# Patient Record
Sex: FEMALE | Race: WHITE | Employment: FULL TIME | URBAN - METROPOLITAN AREA
[De-identification: names, ages, dates, MRNs, and addresses within clinical notes are randomized per-mention and may not be internally consistent; named-entity substitution may affect disease eponyms.]

---

## 2023-03-02 ENCOUNTER — HOSPITAL ENCOUNTER (OUTPATIENT)
Age: 28
Discharge: HOME | End: 2023-03-02
Payer: COMMERCIAL

## 2023-03-02 VITALS
TEMPERATURE: 98 F | HEART RATE: 96 BPM | WEIGHT: 163 LBS | RESPIRATION RATE: 18 BRPM | OXYGEN SATURATION: 96 % | BODY MASS INDEX: 23.34 KG/M2 | SYSTOLIC BLOOD PRESSURE: 129 MMHG | DIASTOLIC BLOOD PRESSURE: 74 MMHG | HEIGHT: 70 IN

## 2023-03-02 DIAGNOSIS — J02.9 ACUTE VIRAL PHARYNGITIS: Primary | ICD-10-CM

## 2023-03-02 DIAGNOSIS — Z76.89 ENCOUNTER FOR ASSESSMENT OF STD EXPOSURE: ICD-10-CM

## 2023-03-02 LAB
B-HCG UR QL: NEGATIVE
POCT MONO: NEGATIVE
S PYO AG THROAT QL: NEGATIVE

## 2023-03-02 PROCEDURE — 99204 OFFICE O/P NEW MOD 45 MIN: CPT

## 2023-03-02 PROCEDURE — 87491 CHLMYD TRACH DNA AMP PROBE: CPT

## 2023-03-02 PROCEDURE — 87591 N.GONORRHOEAE DNA AMP PROB: CPT

## 2023-03-02 PROCEDURE — 86308 HETEROPHILE ANTIBODY SCREEN: CPT

## 2023-03-02 PROCEDURE — 81025 URINE PREGNANCY TEST: CPT

## 2023-03-02 PROCEDURE — 87880 STREP A ASSAY W/OPTIC: CPT

## 2023-03-02 NOTE — DISCHARGE INSTRUCTIONS
Strep test is negative. Mono test is negative. We sent a swab for pharyngeal gonorrhea and Chlamydia testing. I also sent a urine test for gonorrhea and chlamydia. We will call you in a few days if you need further treatment if these come back positive. There are no signs of infection on physical exam.  This is likely a viral illness. Please be sure to drink plenty of fluids, use Tylenol and Motrin for pain or fever. If you develop any respiratory complaints, fever that does not improve with medications or any other concerning complaints you should go to the emergency department. Otherwise follow up with your primary care provider.

## 2023-03-02 NOTE — ED INITIAL ASSESSMENT (HPI)
Pt in [address] [place] [address] for c/o swollen tonsils, lymph nodes with white exudate and fever of 102 since yesterday. States concerned for strep and mono.

## 2023-03-03 LAB
C TRACH DNA SPEC QL NAA+PROBE: NEGATIVE
N GONORRHOEA DNA SPEC QL NAA+PROBE: NEGATIVE

## 2023-03-04 LAB
C. TRACHOMATIS AMPLIFIED: NEGATIVE
N. GONORRHOEAE AMPLIFIED: NEGATIVE

## 2023-04-26 ENCOUNTER — HOSPITAL ENCOUNTER (OUTPATIENT)
Age: 28
Discharge: HOME | End: 2023-04-26
Payer: COMMERCIAL

## 2023-04-26 VITALS
OXYGEN SATURATION: 100 % | RESPIRATION RATE: 17 BRPM | SYSTOLIC BLOOD PRESSURE: 120 MMHG | HEART RATE: 75 BPM | TEMPERATURE: 98 F | DIASTOLIC BLOOD PRESSURE: 75 MMHG

## 2023-04-26 DIAGNOSIS — S01.81XA CHIN LACERATION, INITIAL ENCOUNTER: Primary | ICD-10-CM

## 2023-04-26 PROCEDURE — 90471 IMMUNIZATION ADMIN: CPT | Performed by: NURSE PRACTITIONER

## 2023-04-26 PROCEDURE — 99213 OFFICE O/P EST LOW 20 MIN: CPT | Performed by: NURSE PRACTITIONER

## 2023-04-26 PROCEDURE — 90715 TDAP VACCINE 7 YRS/> IM: CPT | Performed by: NURSE PRACTITIONER

## 2023-04-26 PROCEDURE — 12011 RPR F/E/E/N/L/M 2.5 CM/<: CPT | Performed by: NURSE PRACTITIONER

## 2023-04-27 NOTE — DISCHARGE INSTRUCTIONS
Keep the area dry for the next few hours. After that you may wash with soap and water but avoid emollients and ointments. Tylenol or Motrin as needed for pain. Cool compresses for swelling. Glue and Steri-Strips will fall off in about a week. Avoid picking at it.   Follow-up with your doctor as needed

## 2023-08-30 ENCOUNTER — OFFICE VISIT (OUTPATIENT)
Dept: OCCUPATIONAL MEDICINE | Age: 28
End: 2023-08-30
Attending: NURSE PRACTITIONER

## 2023-08-30 ENCOUNTER — HOSPITAL ENCOUNTER (OUTPATIENT)
Dept: GENERAL RADIOLOGY | Age: 28
Discharge: HOME | End: 2023-08-30
Attending: NURSE PRACTITIONER

## 2023-08-30 DIAGNOSIS — M25.512 LEFT ANTERIOR SHOULDER PAIN: ICD-10-CM

## 2023-08-30 DIAGNOSIS — M25.512 LEFT ANTERIOR SHOULDER PAIN: Primary | ICD-10-CM

## 2023-08-30 PROCEDURE — 73030 X-RAY EXAM OF SHOULDER: CPT | Performed by: NURSE PRACTITIONER

## 2023-08-30 RX ORDER — IBUPROFEN 600 MG/1
600 TABLET ORAL EVERY 6 HOURS
Qty: 28 TABLET | Refills: 0 | Status: SHIPPED | OUTPATIENT
Start: 2023-08-30 | End: 2023-09-06

## 2023-09-06 ENCOUNTER — APPOINTMENT (OUTPATIENT)
Dept: OCCUPATIONAL MEDICINE | Age: 28
End: 2023-09-06
Attending: NURSE PRACTITIONER

## 2023-10-12 ENCOUNTER — HOSPITAL ENCOUNTER (OUTPATIENT)
Dept: MRI IMAGING | Facility: HOSPITAL | Age: 28
Discharge: HOME | End: 2023-10-12
Attending: ORTHOPAEDIC SURGERY

## 2023-10-12 ENCOUNTER — HOSPITAL ENCOUNTER (OUTPATIENT)
Dept: GENERAL RADIOLOGY | Facility: HOSPITAL | Age: 28
Discharge: HOME | End: 2023-10-12
Attending: ORTHOPAEDIC SURGERY
Payer: OTHER MISCELLANEOUS

## 2023-10-12 DIAGNOSIS — M25.512 SHOULDER PAIN, LEFT: ICD-10-CM

## 2023-10-12 PROCEDURE — 23350 INJECTION FOR SHOULDER X-RAY: CPT | Performed by: ORTHOPAEDIC SURGERY

## 2023-10-12 PROCEDURE — 73222 MRI JOINT UPR EXTREM W/DYE: CPT | Performed by: ORTHOPAEDIC SURGERY

## 2023-10-12 PROCEDURE — 77002 NEEDLE LOCALIZATION BY XRAY: CPT | Performed by: ORTHOPAEDIC SURGERY

## 2023-10-12 PROCEDURE — A9575 INJ GADOTERATE MEGLUMI 0.1ML: HCPCS | Performed by: ORTHOPAEDIC SURGERY

## 2023-10-12 RX ORDER — DIPHENHYDRAMINE HYDROCHLORIDE 50 MG/ML
10 INJECTION, SOLUTION INTRAMUSCULAR; INTRAVENOUS
Status: COMPLETED | OUTPATIENT
Start: 2023-10-12 | End: 2023-10-12

## 2023-10-12 RX ADMIN — DIPHENHYDRAMINE HYDROCHLORIDE 0.2 ML: 50 INJECTION, SOLUTION INTRAMUSCULAR; INTRAVENOUS at 10:38:00

## 2023-10-18 ENCOUNTER — HOSPITAL ENCOUNTER (OUTPATIENT)
Dept: GENERAL RADIOLOGY | Facility: HOSPITAL | Age: 28
Discharge: HOME | End: 2023-10-18
Attending: ORTHOPAEDIC SURGERY
Payer: OTHER MISCELLANEOUS

## 2023-10-18 ENCOUNTER — HOSPITAL ENCOUNTER (OUTPATIENT)
Dept: MRI IMAGING | Facility: HOSPITAL | Age: 28
Discharge: HOME | End: 2023-10-18
Attending: ORTHOPAEDIC SURGERY
Payer: OTHER MISCELLANEOUS

## 2023-10-18 DIAGNOSIS — M25.512 SHOULDER PAIN, LEFT: ICD-10-CM

## 2023-10-18 PROCEDURE — 23350 INJECTION FOR SHOULDER X-RAY: CPT | Performed by: ORTHOPAEDIC SURGERY

## 2023-10-18 PROCEDURE — 77002 NEEDLE LOCALIZATION BY XRAY: CPT | Performed by: ORTHOPAEDIC SURGERY

## 2023-10-18 PROCEDURE — A9575 INJ GADOTERATE MEGLUMI 0.1ML: HCPCS | Performed by: ORTHOPAEDIC SURGERY

## 2023-10-18 PROCEDURE — 73222 MRI JOINT UPR EXTREM W/DYE: CPT | Performed by: ORTHOPAEDIC SURGERY

## 2023-10-18 RX ORDER — LIDOCAINE HYDROCHLORIDE 10 MG/ML
INJECTION, SOLUTION EPIDURAL; INFILTRATION; INTRACAUDAL; PERINEURAL
Status: COMPLETED
Start: 2023-10-18 | End: 2023-10-18

## 2023-10-18 RX ORDER — DIPHENHYDRAMINE HYDROCHLORIDE 50 MG/ML
10 INJECTION, SOLUTION INTRAMUSCULAR; INTRAVENOUS
Status: COMPLETED | OUTPATIENT
Start: 2023-10-18 | End: 2023-10-18

## 2023-10-18 RX ADMIN — DIPHENHYDRAMINE HYDROCHLORIDE 0.1 ML: 50 INJECTION, SOLUTION INTRAMUSCULAR; INTRAVENOUS at 11:18:00

## 2023-10-18 RX ADMIN — LIDOCAINE HYDROCHLORIDE 5 ML: 10 INJECTION, SOLUTION EPIDURAL; INFILTRATION; INTRACAUDAL; PERINEURAL at 11:30:00

## 2024-01-30 ENCOUNTER — HOSPITAL ENCOUNTER (OUTPATIENT)
Dept: GENERAL RADIOLOGY | Facility: HOSPITAL | Age: 29
Discharge: HOME | End: 2024-01-30
Attending: ORTHOPAEDIC SURGERY
Payer: OTHER MISCELLANEOUS

## 2024-01-30 ENCOUNTER — LAB ENCOUNTER (OUTPATIENT)
Dept: LAB | Facility: HOSPITAL | Age: 29
End: 2024-01-30
Attending: ORTHOPAEDIC SURGERY
Payer: OTHER MISCELLANEOUS

## 2024-01-30 DIAGNOSIS — Z01.818 PRE-OP EXAM: Primary | ICD-10-CM

## 2024-01-30 DIAGNOSIS — Z01.818 PRE-OP TESTING: ICD-10-CM

## 2024-01-30 LAB
ANION GAP SERPL CALC-SCNC: 5 MMOL/L (ref 0–18)
APTT PPP: 26.3 SECONDS (ref 23.3–35.6)
ATRIAL RATE: 94 BPM
BASOPHILS # BLD AUTO: 0.03 X10(3) UL (ref 0–0.2)
BASOPHILS NFR BLD AUTO: 0.5 %
BUN BLD-MCNC: 16 MG/DL (ref 9–23)
BUN/CREAT SERPL: 21.3 (ref 10–20)
CALCIUM BLD-MCNC: 9.5 MG/DL (ref 8.7–10.4)
CHLORIDE SERPL-SCNC: 109 MMOL/L (ref 98–112)
CO2 SERPL-SCNC: 27 MMOL/L (ref 21–32)
CREAT BLD-MCNC: 0.75 MG/DL
DEPRECATED RDW RBC AUTO: 35.5 FL (ref 35.1–46.3)
EGFRCR SERPLBLD CKD-EPI 2021: 110 ML/MIN/1.73M2 (ref 60–?)
EOSINOPHIL # BLD AUTO: 0.09 X10(3) UL (ref 0–0.7)
EOSINOPHIL NFR BLD AUTO: 1.5 %
ERYTHROCYTE [DISTWIDTH] IN BLOOD BY AUTOMATED COUNT: 11.6 % (ref 11–15)
FASTING STATUS PATIENT QL REPORTED: YES
GLUCOSE BLD-MCNC: 86 MG/DL (ref 70–99)
HCT VFR BLD AUTO: 41.5 %
HGB BLD-MCNC: 14 G/DL
IMM GRANULOCYTES # BLD AUTO: 0.02 X10(3) UL (ref 0–1)
IMM GRANULOCYTES NFR BLD: 0.3 %
INR BLD: 0.93 (ref 0.8–1.2)
LYMPHOCYTES # BLD AUTO: 1.54 X10(3) UL (ref 1–4)
LYMPHOCYTES NFR BLD AUTO: 25 %
MCH RBC QN AUTO: 28.6 PG (ref 26–34)
MCHC RBC AUTO-ENTMCNC: 33.7 G/DL (ref 31–37)
MCV RBC AUTO: 84.7 FL
MONOCYTES # BLD AUTO: 0.43 X10(3) UL (ref 0.1–1)
MONOCYTES NFR BLD AUTO: 7 %
NEUTROPHILS # BLD AUTO: 4.05 X10 (3) UL (ref 1.5–7.7)
NEUTROPHILS # BLD AUTO: 4.05 X10(3) UL (ref 1.5–7.7)
NEUTROPHILS NFR BLD AUTO: 65.7 %
OSMOLALITY SERPL CALC.SUM OF ELEC: 292 MOSM/KG (ref 275–295)
P AXIS: 32 DEGREES
P-R INTERVAL: 136 MS
PLATELET # BLD AUTO: 282 10(3)UL (ref 150–450)
POTASSIUM SERPL-SCNC: 4.2 MMOL/L (ref 3.5–5.1)
PROTHROMBIN TIME: 13 SECONDS (ref 11.6–14.8)
Q-T INTERVAL: 356 MS
QRS DURATION: 68 MS
QTC CALCULATION (BEZET): 445 MS
R AXIS: 41 DEGREES
RBC # BLD AUTO: 4.9 X10(6)UL
SODIUM SERPL-SCNC: 141 MMOL/L (ref 136–145)
T AXIS: 33 DEGREES
VENTRICULAR RATE: 94 BPM
WBC # BLD AUTO: 6.2 X10(3) UL (ref 4–11)

## 2024-01-30 PROCEDURE — 85025 COMPLETE CBC W/AUTO DIFF WBC: CPT

## 2024-01-30 PROCEDURE — 93005 ELECTROCARDIOGRAM TRACING: CPT

## 2024-01-30 PROCEDURE — 36415 COLL VENOUS BLD VENIPUNCTURE: CPT

## 2024-01-30 PROCEDURE — 85730 THROMBOPLASTIN TIME PARTIAL: CPT

## 2024-01-30 PROCEDURE — 93010 ELECTROCARDIOGRAM REPORT: CPT | Performed by: STUDENT IN AN ORGANIZED HEALTH CARE EDUCATION/TRAINING PROGRAM

## 2024-01-30 PROCEDURE — 85610 PROTHROMBIN TIME: CPT

## 2024-01-30 PROCEDURE — 80048 BASIC METABOLIC PNL TOTAL CA: CPT

## 2024-01-30 PROCEDURE — 71046 X-RAY EXAM CHEST 2 VIEWS: CPT | Performed by: ORTHOPAEDIC SURGERY

## 2024-02-08 RX ORDER — IBUPROFEN 400 MG/1
400 TABLET ORAL EVERY 6 HOURS PRN
COMMUNITY

## 2024-02-08 RX ORDER — GARLIC EXTRACT 500 MG
1 CAPSULE ORAL DAILY
COMMUNITY

## 2024-02-12 NOTE — DISCHARGE INSTRUCTIONS
HOME INSTRUCTIONS  [facility]    Post-op Shoulder Arthroscopy   Subacromial Decompression  Rotator Cuff Repair  SLAP Repair Instructions          Diet:  Begin with clear liquids, then resume regular diet as you can tolerate.      2.     Wound Care:  Please wash your hands before and after dressing changes.  Remove the dressing in two days and apply band-aids to the wounds.  Shower in 24 hours but you must cover the wound with plastic to keep it dry for 48 hours.  Do not bathe or swim until sutures are removed.  Sutures will be removed at the first office visit    3.   Icing:  Apply ice packs to shoulder 3 to 4 times per day for 20 minutes at a time for swelling and pain    4. Sling:  For rotator cuff repair or Labral (cartilage) repair:  A special brace may be applied to stabilize your shoulder.  You may remove it for showering and range of motion exercises for elbow and wrist but then reapply and wear it at ALL times until seen for your follow up visit.     5. Physical Therapy:  See PT sheet.    8. Pain Control:  You may have pain in your shoulder the night of surgery after the effects of the local anesthetic wear off.  The pain will respond best to rest, ice, elevation and the pain medicine you were given.  Pain medication may make you feel drowsy.  Use the medication as prescribed and DO NOT DRIVE, DRINK ALCOHOL, OR PERFORM DUTIES THAT REQUIRE CONCENTRATION OR MANUAL LABOR while on the medication.  Take the medication with food or milk.  After the first 48 hours after surgery it may be helpful to take an over-the-counter anti-inflammatory such as ibuprofen for pain control if you do not have any difficulties with these medicines (i.e. stomach ulcers, kidney problems, allergy to aspirin).  Do not take both anti-inflammatory medications and aspirin at the same time.      7. Driving/Work/School:  At the first office visit after surgery your doctor will discuss when you can drive and  return to work or school.  If you need this information sooner please call your doctor's office.     8. Sports:  Do not resume sports until you have discussed your activity with your Doctor at the first post-op visit.       9. Follow-up Appointment:  You should have a follow-up appointment scheduled 10-14 days after your surgery.  Please call the Doctors office to schedule this appointment.   [place]  [phone]   [place]  [phone]   [place] [phone]    14.   Additional Instructions:  SIGNS AND SYMPTOMS to report to your Doctor:  Persistent fever greater than 100 degrees; wound redness or drainage; numbness and /or tingling in the affected extremity or any severe calf pain.  Please feel free to call the Doctors office if there are any problems.         [facility] HOME CARE INSTRUCTIONS: POST-OP ANESTHESIA  The medication that you received for sedation or general anesthesia can last up to 24 hours. Your judgment and reflexes may be altered, even if you feel like your normal self.      We Recommend:   Do not drive any motor vehicle or bicycle   Avoid mowing the lawn, playing sports, or working with power tools/applicances (power saws, electric knives or mixers)   That you have someone stay with you on your first night home   Do not drink alcohol or take sleeping pills or tranquilizers   Do not sign legal documents within 24 hours of your procedure   If you had a nerve block for your surgery, take extra care not to put any pressure on your arm or hand for 24 hours    It is normal:  For you to have a sore throat if you had a breathing tube during surgery (while you were asleep!). The sore throat should get better within 48 hours. You can gargle with warm salt water (1/2 tsp in 4 oz warm water) or use a throat lozenge for comfort  To feel muscle aches or soreness especially in the abdomen, chest or neck. The achy feeling should go away in the next 24 hours  To feel weak, sleepy or \"wiped out\". Your  should start feeling better in the next 24 hours.   To experience mild discomforts such as sore lip or tongue, headache, cramps, gas pains or a bloated feeling in your abdomen.   To experience mild back pain or soreness for a day or two if you had spinal or epidural anesthesia.   If you had laparoscopic surgery, to feel shoulder pain or discomfort on the day of surgery.   For some patients to have nausea after surgery/anesthesia    If you feel nausea or experience vomiting:   Try to move around less.   Eat less than usual or drink only liquids until the next morning   Nausea should resolve in about 24 hours    If you have a problem when you are at home:    Call your surgeons office   Discharge Instructions: After Your Surgery  You’ve just had surgery. During surgery, you were given medicine called anesthesia to keep you relaxed and free of pain. After surgery, you may have some pain or nausea. This is common. Here are some tips for feeling better and getting well after surgery.   Going home  Your healthcare provider will show you how to take care of yourself when you go home. They'll also answer your questions. Have an adult family member or friend drive you home. For the first 24 hours after your surgery:   Don't drive or use heavy equipment.  Don't make important decisions or sign legal papers.  Take medicines as directed.  Don't drink alcohol.  Have someone stay with you, if needed. They can watch for problems and help keep you safe.  Be sure to go to all follow-up visits with your healthcare provider. And rest after your surgery for as long as your provider tells you to.   Coping with pain  If you have pain after surgery, pain medicine will help you feel better. Take it as directed, before pain becomes severe. Also, ask your healthcare provider or pharmacist about other ways to control pain. This might be with heat, ice, or relaxation. And follow any other instructions your surgeon or nurse gives you.      Stay  on schedule with your medicine.     Tips for taking pain medicine  To get the best relief possible, remember these points:   Pain medicines can upset your stomach. Taking them with a little food may help.  Most pain relievers taken by mouth need at least 20 to 30 minutes to start to work.  Don't wait till your pain becomes severe before you take your medicine. Try to time your medicine so that you can take it before starting an activity. This might be before you get dressed, go for a walk, or sit down for dinner.  Constipation is a common side effect of some pain medicines. Call your healthcare provider before taking any medicines such as laxatives or stool softeners to help ease constipation. Also ask if you should skip any foods. Drinking lots of fluids and eating foods such as fruits and vegetables that are high in fiber can also help. Remember, don't take laxatives unless your surgeon has prescribed them.  Drinking alcohol and taking pain medicine can cause dizziness and slow your breathing. It can even be deadly. Don't drink alcohol while taking pain medicine.  Pain medicine can make you react more slowly to things. Don't drive or run machinery while taking pain medicine.  Your healthcare provider may tell you to take acetaminophen to help ease your pain. Ask them how much you're supposed to take each day. Acetaminophen or other pain relievers may interact with your prescription medicines or other over-the-counter (OTC) medicines. Some prescription medicines have acetaminophen and other ingredients in them. Using both prescription and OTC acetaminophen for pain can cause you to accidentally overdose. Read the labels on your OTC medicines with care. This will help you to clearly know the list of ingredients, how much to take, and any warnings. It may also help you not take too much acetaminophen. If you have questions or don't understand the information, ask your pharmacist or healthcare provider to explain it  to you before you take the OTC medicine.   Managing nausea  Some people have an upset stomach (nausea) after surgery. This is often because of anesthesia, pain, or pain medicine, less movement of food in the stomach, or the stress of surgery. These tips will help you handle nausea and eat healthy foods as you get better. If you were on a special food plan before surgery, ask your healthcare provider if you should follow it while you get better. Check with your provider on how your eating should progress. It may depend on the surgery you had. These general tips may help:   Don't push yourself to eat. Your body will tell you when to eat and how much.  Start off with clear liquids and soup. They're easier to digest.  Next try semi-solid foods as you feel ready. These include mashed potatoes, applesauce, and gelatin.  Slowly move to solid foods. Don’t eat fatty, rich, or spicy foods at first.  Don't force yourself to have 3 large meals a day. Instead eat smaller amounts more often.  Take pain medicines with a small amount of solid food, such as crackers or toast. This helps prevent nausea.  When to call your healthcare provider  Call your healthcare provider right away if you have any of these:   You still have too much pain, or the pain gets worse, after taking the medicine. The medicine may not be strong enough. Or there may be a complication from the surgery.  You feel too sleepy, dizzy, or groggy. The medicine may be too strong.  Side effects such as nausea or vomiting. Your healthcare provider may advise taking other medicines to .  Skin changes such as rash, itching, or hives. This may mean you have an allergic reaction. Your provider may advise taking other medicines.  The incision looks different (for instance, part of it opens up).  Bleeding or fluid leaking from the incision site, and weren't told to expect that.  Fever of 100.4°F (38°C) or higher, or as directed by your provider.  Call 911  Call 911 right  away if you have:   Trouble breathing  Facial swelling    If you have obstructive sleep apnea   You were given anesthesia medicine during surgery to keep you comfortable and free of pain. After surgery, you may have more apnea spells because of this medicine and other medicines you were given. The spells may last longer than normal.    At home:  Keep using the continuous positive airway pressure (CPAP) device when you sleep. Unless your healthcare provider tells you not to, use it when you sleep, day or night. CPAP is a common device used to treat obstructive sleep apnea.  Talk with your provider before taking any pain medicine, muscle relaxants, or sedatives. Your provider will tell you about the possible dangers of taking these medicines.  Contact your provider if your sleeping changes a lot even when taking medicines as directed.  Berenice last reviewed this educational content on 10/1/2021  © 4034-6992 [employer]. All rights reserved. This information is not intended as a substitute for professional medical care. Always follow your healthcare professional's instructions.

## 2024-02-13 ENCOUNTER — HOSPITAL ENCOUNTER (OUTPATIENT)
Facility: HOSPITAL | Age: 29
Setting detail: HOSPITAL OUTPATIENT SURGERY
Discharge: HOME | End: 2024-02-13
Attending: ORTHOPAEDIC SURGERY | Admitting: ORTHOPAEDIC SURGERY
Payer: OTHER MISCELLANEOUS

## 2024-02-13 ENCOUNTER — ANESTHESIA (OUTPATIENT)
Dept: SURGERY | Facility: HOSPITAL | Age: 29
End: 2024-02-13
Payer: OTHER MISCELLANEOUS

## 2024-02-13 ENCOUNTER — ANESTHESIA EVENT (OUTPATIENT)
Dept: SURGERY | Facility: HOSPITAL | Age: 29
End: 2024-02-13
Payer: OTHER MISCELLANEOUS

## 2024-02-13 VITALS
HEART RATE: 69 BPM | HEIGHT: 70 IN | RESPIRATION RATE: 16 BRPM | OXYGEN SATURATION: 100 % | BODY MASS INDEX: 27.63 KG/M2 | WEIGHT: 193 LBS | SYSTOLIC BLOOD PRESSURE: 127 MMHG | DIASTOLIC BLOOD PRESSURE: 70 MMHG | TEMPERATURE: 98 F

## 2024-02-13 PROBLEM — M25.512 LEFT SHOULDER PAIN: Status: ACTIVE | Noted: 2024-02-13

## 2024-02-13 LAB — B-HCG UR QL: NEGATIVE

## 2024-02-13 PROCEDURE — 0RNK4ZZ RELEASE LEFT SHOULDER JOINT, PERCUTANEOUS ENDOSCOPIC APPROACH: ICD-10-PCS | Performed by: ORTHOPAEDIC SURGERY

## 2024-02-13 PROCEDURE — 81025 URINE PREGNANCY TEST: CPT

## 2024-02-13 PROCEDURE — 0RQK4ZZ REPAIR LEFT SHOULDER JOINT, PERCUTANEOUS ENDOSCOPIC APPROACH: ICD-10-PCS | Performed by: ORTHOPAEDIC SURGERY

## 2024-02-13 PROCEDURE — 64415 NJX AA&/STRD BRCH PLXS IMG: CPT | Performed by: ORTHOPAEDIC SURGERY

## 2024-02-13 DEVICE — SUTURE ANCHOR, BIO- COMPOSITE SUTURETAK
Type: IMPLANTABLE DEVICE | Site: SHOULDER | Status: FUNCTIONAL
Brand: ARTHREX®

## 2024-02-13 RX ORDER — HYDROMORPHONE HYDROCHLORIDE 1 MG/ML
0.2 INJECTION, SOLUTION INTRAMUSCULAR; INTRAVENOUS; SUBCUTANEOUS EVERY 5 MIN PRN
Status: DISCONTINUED | OUTPATIENT
Start: 2024-02-13 | End: 2024-02-13

## 2024-02-13 RX ORDER — ROCURONIUM BROMIDE 10 MG/ML
INJECTION, SOLUTION INTRAVENOUS AS NEEDED
Status: DISCONTINUED | OUTPATIENT
Start: 2024-02-13 | End: 2024-02-13 | Stop reason: SURG

## 2024-02-13 RX ORDER — ONDANSETRON 2 MG/ML
4 INJECTION INTRAMUSCULAR; INTRAVENOUS EVERY 6 HOURS PRN
Status: DISCONTINUED | OUTPATIENT
Start: 2024-02-13 | End: 2024-02-13

## 2024-02-13 RX ORDER — CEFAZOLIN SODIUM/WATER 2 G/20 ML
SYRINGE (ML) INTRAVENOUS AS NEEDED
Status: DISCONTINUED | OUTPATIENT
Start: 2024-02-13 | End: 2024-02-13 | Stop reason: SURG

## 2024-02-13 RX ORDER — CEFAZOLIN SODIUM/WATER 2 G/20 ML
2 SYRINGE (ML) INTRAVENOUS ONCE
Status: DISCONTINUED | OUTPATIENT
Start: 2024-02-13 | End: 2024-02-13 | Stop reason: HOSPADM

## 2024-02-13 RX ORDER — SODIUM CHLORIDE, SODIUM LACTATE, POTASSIUM CHLORIDE, CALCIUM CHLORIDE 600; 310; 30; 20 MG/100ML; MG/100ML; MG/100ML; MG/100ML
INJECTION, SOLUTION INTRAVENOUS CONTINUOUS
Status: DISCONTINUED | OUTPATIENT
Start: 2024-02-13 | End: 2024-02-13

## 2024-02-13 RX ORDER — MORPHINE SULFATE 10 MG/ML
6 INJECTION, SOLUTION INTRAMUSCULAR; INTRAVENOUS EVERY 10 MIN PRN
Status: DISCONTINUED | OUTPATIENT
Start: 2024-02-13 | End: 2024-02-13

## 2024-02-13 RX ORDER — ROPIVACAINE HYDROCHLORIDE 5 MG/ML
INJECTION, SOLUTION EPIDURAL; INFILTRATION; PERINEURAL
Status: COMPLETED | OUTPATIENT
Start: 2024-02-13 | End: 2024-02-13

## 2024-02-13 RX ORDER — GLYCOPYRROLATE 0.2 MG/ML
INJECTION, SOLUTION INTRAMUSCULAR; INTRAVENOUS AS NEEDED
Status: DISCONTINUED | OUTPATIENT
Start: 2024-02-13 | End: 2024-02-13 | Stop reason: SURG

## 2024-02-13 RX ORDER — MIDAZOLAM HYDROCHLORIDE 1 MG/ML
INJECTION INTRAMUSCULAR; INTRAVENOUS
Status: COMPLETED | OUTPATIENT
Start: 2024-02-13 | End: 2024-02-13

## 2024-02-13 RX ORDER — PROCHLORPERAZINE EDISYLATE 5 MG/ML
5 INJECTION INTRAMUSCULAR; INTRAVENOUS EVERY 8 HOURS PRN
Status: DISCONTINUED | OUTPATIENT
Start: 2024-02-13 | End: 2024-02-13

## 2024-02-13 RX ORDER — NEOSTIGMINE METHYLSULFATE 1 MG/ML
INJECTION, SOLUTION INTRAVENOUS AS NEEDED
Status: DISCONTINUED | OUTPATIENT
Start: 2024-02-13 | End: 2024-02-13 | Stop reason: SURG

## 2024-02-13 RX ORDER — HYDROMORPHONE HYDROCHLORIDE 1 MG/ML
0.4 INJECTION, SOLUTION INTRAMUSCULAR; INTRAVENOUS; SUBCUTANEOUS EVERY 5 MIN PRN
Status: DISCONTINUED | OUTPATIENT
Start: 2024-02-13 | End: 2024-02-13

## 2024-02-13 RX ORDER — MORPHINE SULFATE 4 MG/ML
4 INJECTION, SOLUTION INTRAMUSCULAR; INTRAVENOUS EVERY 10 MIN PRN
Status: DISCONTINUED | OUTPATIENT
Start: 2024-02-13 | End: 2024-02-13

## 2024-02-13 RX ORDER — HYDROMORPHONE HYDROCHLORIDE 1 MG/ML
0.6 INJECTION, SOLUTION INTRAMUSCULAR; INTRAVENOUS; SUBCUTANEOUS EVERY 5 MIN PRN
Status: DISCONTINUED | OUTPATIENT
Start: 2024-02-13 | End: 2024-02-13

## 2024-02-13 RX ORDER — DEXAMETHASONE SODIUM PHOSPHATE 4 MG/ML
VIAL (ML) INJECTION AS NEEDED
Status: DISCONTINUED | OUTPATIENT
Start: 2024-02-13 | End: 2024-02-13 | Stop reason: SURG

## 2024-02-13 RX ORDER — MORPHINE SULFATE 2 MG/ML
2 INJECTION, SOLUTION INTRAMUSCULAR; INTRAVENOUS EVERY 10 MIN PRN
Status: DISCONTINUED | OUTPATIENT
Start: 2024-02-13 | End: 2024-02-13

## 2024-02-13 RX ORDER — NALOXONE HYDROCHLORIDE 0.4 MG/ML
80 INJECTION, SOLUTION INTRAMUSCULAR; INTRAVENOUS; SUBCUTANEOUS AS NEEDED
Status: DISCONTINUED | OUTPATIENT
Start: 2024-02-13 | End: 2024-02-13

## 2024-02-13 RX ORDER — DEXAMETHASONE SODIUM PHOSPHATE 10 MG/ML
INJECTION, SOLUTION INTRAMUSCULAR; INTRAVENOUS
Status: COMPLETED | OUTPATIENT
Start: 2024-02-13 | End: 2024-02-13

## 2024-02-13 RX ORDER — LIDOCAINE HYDROCHLORIDE 10 MG/ML
INJECTION, SOLUTION EPIDURAL; INFILTRATION; INTRACAUDAL; PERINEURAL AS NEEDED
Status: DISCONTINUED | OUTPATIENT
Start: 2024-02-13 | End: 2024-02-13 | Stop reason: SURG

## 2024-02-13 RX ORDER — ONDANSETRON 2 MG/ML
INJECTION INTRAMUSCULAR; INTRAVENOUS AS NEEDED
Status: DISCONTINUED | OUTPATIENT
Start: 2024-02-13 | End: 2024-02-13 | Stop reason: SURG

## 2024-02-13 RX ORDER — LIDOCAINE HYDROCHLORIDE 10 MG/ML
INJECTION, SOLUTION INFILTRATION; PERINEURAL
Status: COMPLETED | OUTPATIENT
Start: 2024-02-13 | End: 2024-02-13

## 2024-02-13 RX ORDER — MEPERIDINE HYDROCHLORIDE 25 MG/ML
12.5 INJECTION INTRAMUSCULAR; INTRAVENOUS; SUBCUTANEOUS AS NEEDED
Status: DISCONTINUED | OUTPATIENT
Start: 2024-02-13 | End: 2024-02-13

## 2024-02-13 RX ORDER — ACETAMINOPHEN 500 MG
1000 TABLET ORAL ONCE
Status: COMPLETED | OUTPATIENT
Start: 2024-02-13 | End: 2024-02-13

## 2024-02-13 RX ADMIN — DEXAMETHASONE SODIUM PHOSPHATE 4 MG: 4 MG/ML VIAL (ML) INJECTION at 12:54:00

## 2024-02-13 RX ADMIN — NEOSTIGMINE METHYLSULFATE 5 MG: 1 INJECTION, SOLUTION INTRAVENOUS at 14:03:00

## 2024-02-13 RX ADMIN — CEFAZOLIN SODIUM/WATER 2 G: 2 G/20 ML SYRINGE (ML) INTRAVENOUS at 13:00:00

## 2024-02-13 RX ADMIN — ROPIVACAINE HYDROCHLORIDE 30 ML: 5 INJECTION, SOLUTION EPIDURAL; INFILTRATION; PERINEURAL at 12:29:00

## 2024-02-13 RX ADMIN — ROCURONIUM BROMIDE 50 MG: 10 INJECTION, SOLUTION INTRAVENOUS at 12:54:00

## 2024-02-13 RX ADMIN — ONDANSETRON 4 MG: 2 INJECTION INTRAMUSCULAR; INTRAVENOUS at 12:54:00

## 2024-02-13 RX ADMIN — GLYCOPYRROLATE 1 MG: 0.2 INJECTION, SOLUTION INTRAMUSCULAR; INTRAVENOUS at 14:03:00

## 2024-02-13 RX ADMIN — LIDOCAINE HYDROCHLORIDE 3 ML: 10 INJECTION, SOLUTION INFILTRATION; PERINEURAL at 12:29:00

## 2024-02-13 RX ADMIN — LIDOCAINE HYDROCHLORIDE 50 MG: 10 INJECTION, SOLUTION EPIDURAL; INFILTRATION; INTRACAUDAL; PERINEURAL at 12:52:00

## 2024-02-13 RX ADMIN — DEXAMETHASONE SODIUM PHOSPHATE 10 MG: 10 INJECTION, SOLUTION INTRAMUSCULAR; INTRAVENOUS at 12:29:00

## 2024-02-13 RX ADMIN — SODIUM CHLORIDE, SODIUM LACTATE, POTASSIUM CHLORIDE, CALCIUM CHLORIDE: 600; 310; 30; 20 INJECTION, SOLUTION INTRAVENOUS at 14:17:00

## 2024-02-13 RX ADMIN — MIDAZOLAM HYDROCHLORIDE 2 MG: 1 INJECTION INTRAMUSCULAR; INTRAVENOUS at 12:29:00

## 2024-02-13 NOTE — H&P
[facility]    History & Physical    Rosaura Sarah Patient Status:  Hospital Outpatient Surgery    1995 MRN [id number]   Location [facility] PRE OP RECOVERY Attending Murali Wadsworth MD   Hosp Day # 0 PCP No primary care provider on file.     Date of Admission:  2024    History of Present Illness:  Rosaura Sarah is a(n) 28 year old female.     She complains of left shoulder pain.      History:  Past Medical History:   Diagnosis Date    Asthma     Bipolar affective (HCC)     Chondromalacia 2013    Dysmenorrhea     resolved    Eczema     Eustachian tube dysfunction     saw ent    Interstitial cystitis      Past Surgical History:   Procedure Laterality Date    PATIENT DENIES ANY SURGICAL HISTORY       Family History   Problem Relation Age of Onset    Hypertension Father     Other (thyroid disease[Other]) Mother     Heart Disorder Maternal Grandfather     Cancer Other 50        breast cancer/breast cancer    Other (asthma[Other]) Maternal Grandmother     Other (thyroid problems[Other]) Sister         + HPV    Obesity Sister         +HPV    Other (recurrent bladder and kidney infections[Other]) Sister         +HPV    Other (kidney stones[Other]) Sister     Other (cervical dysplasia[Other]) Sister         +HPV    Cancer Maternal Aunt         metastasis from breast ca to liver    Other (ovarian cysts [Other]) Sister       reports that she has quit smoking. Her smoking use included cigarettes. She has a 3.5 pack-year smoking history. She has never used smokeless tobacco. She reports current alcohol use.  Drug: Cannabis.    Allergies:  Allergies   Allergen Reactions    Benzoyl Peroxide OTHER (SEE COMMENTS)     Has contact dermatitis,pustulate       Home Medications:  Medications Prior to Admission   Medication Sig Dispense Refill Last Dose    acidophilus-pectin Oral Cap Take 1 capsule by mouth daily.   2024    ibuprofen 400 MG Oral Tab Take 1 tablet (400 mg total) by mouth  every 6 (six) hours as needed for Pain.   2/1/2024       Physical Exam:  General: Alert, orientated x3.  Cooperative.  No apparent distress.  Vital Signs:  /73 (BP Location: Right arm)   Pulse 79   Temp 98.5 °F (36.9 °C) (Oral)   Resp 14   Ht 5' 10\" (1.778 m)   Wt 193 lb (87.5 kg)   LMP 01/27/2024   SpO2 98%   BMI 27.69 kg/m²   Extremities:  No lower extremity edema noted.  Without clubbing or cyanosis.      L shoulder:  Positive Neer's.  NVI distally.      Laboratory Data:         Impression and Plan:  Patient Active Problem List   Diagnosis    Interstitial cystitis    Chondromalacia    Eczema     L shoulder labral tear  -Risks and benefits discussed.  She wishes to proceed with surgery.      Murali Wadsworth MD  2/13/2024  12:21 PM

## 2024-02-13 NOTE — ANESTHESIA PREPROCEDURE EVALUATION
Anesthesia PreOp Note    HPI:     Rosaura Sarah is a 28 year old female who presents for preoperative consultation requested by: Murali Wadsworth MD    Date of Surgery: 2/13/2024    Procedure(s):  Left shoulder arthroscopic possible labral repair, possible anterior capsulorrhaphy and decompression  Indication: Left shoulder pain    Relevant Problems   No relevant active problems       NPO:  Last Liquid Consumption Date: 02/13/24  Last Liquid Consumption Time: 0745  Last Solid Consumption Date: 02/12/24  Last Solid Consumption Time: 1900  Last Liquid Consumption Date: 02/13/24          History Review:  Patient Active Problem List    Diagnosis Date Noted    Chondromalacia 03/08/2013    Eczema     Interstitial cystitis 02/12/2013       Past Medical History:   Diagnosis Date    Asthma     Bipolar affective (HCC)     Chondromalacia 03/08/2013    Dysmenorrhea     resolved    Eczema     Eustachian tube dysfunction     saw ent    Interstitial cystitis        Past Surgical History:   Procedure Laterality Date    PATIENT DENIES ANY SURGICAL HISTORY         Medications Prior to Admission   Medication Sig Dispense Refill Last Dose    acidophilus-pectin Oral Cap Take 1 capsule by mouth daily.   2/8/2024    ibuprofen 400 MG Oral Tab Take 1 tablet (400 mg total) by mouth every 6 (six) hours as needed for Pain.   2/1/2024     Current Facility-Administered Medications Ordered in Epic   Medication Dose Route Frequency Provider Last Rate Last Admin    lactated ringers infusion   Intravenous Continuous Murali Wadsworth MD 20 mL/hr at 02/13/24 1109 New Bag at 02/13/24 1109    ceFAZolin (Ancef) 2 g in 20mL IV syringe premix  2 g Intravenous Once Murali Wadsworth MD         No current [facility]-ordered outpatient medications on file.       Allergies   Allergen Reactions    Benzoyl Peroxide OTHER (SEE COMMENTS)     Has contact dermatitis,pustulate       Family History   Problem Relation Age of Onset    Hypertension Father     Other (thyroid disease[Other])  Mother     Heart Disorder Maternal Grandfather     Cancer Other 50        breast cancer/breast cancer    Other (asthma[Other]) Maternal Grandmother     Other (thyroid problems[Other]) Sister         + HPV    Obesity Sister         +HPV    Other (recurrent bladder and kidney infections[Other]) Sister         +HPV    Other (kidney stones[Other]) Sister     Other (cervical dysplasia[Other]) Sister         +HPV    Cancer Maternal Aunt         metastasis from breast ca to liver    Other (ovarian cysts [Other]) Sister      Social History     Socioeconomic History    Marital status: Single   Occupational History    Occupation: works at [employer]   Tobacco Use    Smoking status: Former     Packs/day: 0.50     Years: 7.00     Additional pack years: 0.00     Total pack years: 3.50     Types: Cigarettes    Smokeless tobacco: Never   Vaping Use    Vaping Use: Never used   Substance and Sexual Activity    Alcohol use: Yes     Alcohol/week: 0.0 - 1.0 standard drinks of alcohol    Sexual activity: Yes     Partners: Male     Birth control/protection: Inserts       Available pre-op labs reviewed.  Lab Results   Component Value Date    WBC 6.2 01/30/2024    RBC 4.90 01/30/2024    HGB 14.0 01/30/2024    HCT 41.5 01/30/2024    MCV 84.7 01/30/2024    MCH 28.6 01/30/2024    MCHC 33.7 01/30/2024    RDW 11.6 01/30/2024    .0 01/30/2024    URINEPREG Negative 02/13/2024     Lab Results   Component Value Date     01/30/2024    K 4.2 01/30/2024     01/30/2024    CO2 27.0 01/30/2024    BUN 16 01/30/2024    CREATSERUM 0.75 01/30/2024    GLU 86 01/30/2024    CA 9.5 01/30/2024     Lab Results   Component Value Date    INR 0.93 01/30/2024       Vital Signs:  Body mass index is 27.69 kg/m².   height is 1.778 m (5' 10\") and weight is 87.5 kg (193 lb). Her oral temperature is 98.5 °F (36.9 °C). Her blood pressure is 136/73 and her pulse is 79. Her respiration is 14 and oxygen saturation is 98%.   Vitals:    02/08/24 1048 02/13/24 1106    BP:  136/73   Pulse:  79   Resp:  14   Temp:  98.5 °F (36.9 °C)   TempSrc:  Oral   SpO2:  98%   Weight: 86.2 kg (190 lb) 87.5 kg (193 lb)   Height: 1.778 m (5' 10\") 1.778 m (5' 10\")        Anesthesia Evaluation     Patient summary reviewed and Nursing notes reviewed    Airway   Mallampati: II  TM distance: >3 FB  Neck ROM: full  Dental      Pulmonary - normal exam    breath sounds clear to auscultation  (+) asthma  Cardiovascular - negative ROS and normal exam    Rhythm: regular  Rate: normal    Neuro/Psych - negative ROS   (+)   bipolar disorder      GI/Hepatic/Renal - negative ROS     Endo/Other - negative ROS   Abdominal                  Anesthesia Plan:   ASA:  2  Plan:   General  Airway:  ETT  Post-op Pain Management: Interscalene block  Informed Consent Plan and Risks Discussed With:  Patient  Discussed plan with:  CRNA      I have informed Rosaura Sarah and/or legal guardian or family member of the nature of the anesthetic plan, benefits, risks including possible dental damage if relevant, major complications, and any alternative forms of anesthetic management.   All of the patient's questions were answered to the best of my ability. The patient desires the anesthetic management as planned.  ED AVALOS MD  2/13/2024 12:40 PM  Present on Admission:  **None**

## 2024-02-13 NOTE — ANESTHESIA POSTPROCEDURE EVALUATION
Patient: Rosaura Sarah    Procedure Summary       Date: 02/13/24 Room / Location: [facility] MAIN OR 02 / [facility] MAIN OR    Anesthesia Start: 1247 Anesthesia Stop:     Procedure: Left shoulder arthroscopic, anterior capsulorrhaphy and decompression (Left: Shoulder) Diagnosis: (Left shoulder pain)    Surgeons: Murali Wadsworth MD Anesthesiologist: Jerry Santana MD    Anesthesia Type: general ASA Status: 2            Anesthesia Type: general    Vitals Value Taken Time   /91 02/13/24 1416   Temp 97.4 °F (36.3 °C) 02/13/24 1416   Pulse 89 02/13/24 1416   Resp 13 02/13/24 1416   SpO2 97 % 02/13/24 1416   Vitals shown include unfiled device data.    [facility] AN Post Evaluation:   Patient Evaluated in PACU  Patient Participation: complete - patient participated  Level of Consciousness: sleepy but conscious  Pain Score: 0  Pain Management: adequate  Airway Patency:patent  Dental exam unchanged from preop  Yes    Nausea/Vomiting: inadequate, being treated  Cardiovascular Status: hemodynamically stable  Respiratory Status: nasal cannula  Postoperative Hydration acceptable      Shannan Porter CRNA  2/13/2024 2:17 PM

## 2024-02-13 NOTE — ANESTHESIA PROCEDURE NOTES
Peripheral Block    Date/Time: 2/13/2024 12:29 PM    Performed by: Jerry Santana MD  Authorized by: Jerry Santana MD      General Information and Staff    Start Time:  2/13/2024 12:29 PM  End Time:  2/13/2024 12:37 PM  Anesthesiologist:  Jerry Santana MD  Performed by:  Anesthesiologist  Patient Location:  PACU      Site Identification: real time ultrasound guided and image stored and retrievable      Reason for Block: at surgeon's request and post-op pain management    Preanesthetic Checklist: 2 patient identifers, IV checked, risks and benefits discussed, monitors and equipment checked, pre-op evaluation, timeout performed, anesthesia consent and sterile technique used      Procedure Details    Patient Position:  Sitting  Prep: ChloraPrep    Monitoring:  Cardiac monitor  Block Type:  Interscalene  Injection Technique:  Single-shot    Needle    Needle Type:  Short-bevel  Needle Gauge:  22 G  Needle Length:  50 mm  Needle Localization:  Ultrasound guidance and nerve stimulator  Reason for Ultrasound Use: appropriate spread of the medication was noted in real time and no ultrasound evidence of intravascular and/or intraneural injection      Muscle Twitch Response: deltoid response      Assessment    Injection Assessment:  Good spread noted, incremental injection, local visualized surrounding nerve on ultrasound, low pressure, negative aspiration for heme and no pain on injection  Heart Rate Change: No        Medications  2/13/2024 12:29 PM  midazolam (VERSED)injection 2mg/2ml - Intravenous   2 mg - 2/13/2024 12:29:00 PM  lidocaine injection 1% - Intradermal   3 mL - 2/13/2024 12:29:00 PM  ropivacaine (NAROPIN) injection 0.5% - Infiltration   30 mL - 2/13/2024 12:29:00 PM  dexamethasone (DECADRON) PF injection 10 mg/ml - Injection   10 mg - 2/13/2024 12:29:00 PM    Additional Comments

## 2024-02-13 NOTE — ANESTHESIA PROCEDURE NOTES
Airway  Date/Time: 2/13/2024 12:56 PM  Urgency: elective    Airway not difficult    General Information and Staff    Patient location during procedure: OR  Anesthesiologist: Jerry Santana MD  Resident/CRNA: Shannan Porter CRNA  Performed: CRNA   Performed by: Shannan Porter CRNA  Authorized by: Jerry Santana MD      Indications and Patient Condition  Indications for airway management: anesthesia  Sedation level: deep  Preoxygenated: yes  Patient position: sniffing  Mask difficulty assessment: 2 - vent by mask + OA or adjuvant +/- NMBA    Final Airway Details  Final airway type: endotracheal airway      Successful airway: ETT  Cuffed: yes   Successful intubation technique: direct laryngoscopy  Facilitating devices/methods: intubating stylet  Endotracheal tube insertion site: oral  Blade: Cecelia  Blade size: #3  ETT size (mm): 7.0    Cormack-Lehane Classification: grade I - full view of glottis  Placement verified by: capnometry   Measured from: teeth  ETT to teeth (cm): 21    Additional Comments  (+) ETCO2 and BBSE. ETT secured.

## 2024-02-13 NOTE — BRIEF OP NOTE
Pre-Operative Diagnosis: Left shoulder pain     Post-Operative Diagnosis: Left shoulder anterior instability and impingement.     Procedure Performed:   Left shoulder arthroscopic, anterior capsulorrhaphy and decompression    Surgeon(s) and Role:     * Murali Wadsworth MD - Primary       Assistant(s):   Chuy Carrasco MD     Surgical Findings: Positive Drive through sign, impingement     Specimen: None     Estimated Blood Loss: Blood Output: 3 mL (2/13/2024  1:50 PM)      Dictation Number:  tbd    Murali Wadsworth MD  2/13/2024  2:02 PM

## 2024-04-15 NOTE — OPERATIVE REPORT
[facility]    PATIENT'S NAME: KAMLESH ARCE   ATTENDING PHYSICIAN: Murali Wadsworth MD   OPERATING PHYSICIAN: Murali Wadsworth MD   PATIENT ACCOUNT#:   [id number]    LOCATION:  [facility] 1 [facility] 10  MEDICAL RECORD #:   [id number]       YOB: 1995  ADMISSION DATE:       02/13/2024      OPERATION DATE:  02/13/2024    OPERATIVE REPORT      PREOPERATIVE DIAGNOSIS:  Left shoulder pain.  POSTOPERATIVE DIAGNOSIS:    1.   Left shoulder anterior instability.  2.   Left shoulder secondary impingement.  PROCEDURE:    1.   Left shoulder arthroscopic anterior capsulorrhaphy.  2.   Left shoulder arthroscopic decompression.    ASSISTANT SURGEON:  Dr. Chuy Carrasco.    ANESTHESIA:  Interscalene block with general.    ESTIMATED BLOOD LOSS:  Minimal.    COMPLICATIONS:  None.    INDICATIONS:  The patient is a 28-year-old female who injured her left shoulder at work.  An MRI demonstrated a questionable labral tear.  She underwent conservative treatment, including cortisone injections and therapy.  Despite conservative treatment, she still had pain in her left shoulder.  Treatment options were discussed with her.  She wished to proceed with a left shoulder diagnostic arthroscopy, possible labral repair, possible decompression.  Risks, including, but not limited to, bleeding, infection, nerve damage, persistent pain, posttraumatic arthritis, adhesive capsulitis, re-tearing of repaired rotator cuff labrum, DVT, PE were discussed with the patient.  The patient understood these risks and wished to proceed with the procedure as stated above.    OPERATIVE TECHNIQUE:  The patient was met in the preoperative holding area.  Both the patient and I confirmed that the left shoulder was the operative site.  The left shoulder was marked accordingly.  IV antibiotics were administered to the patient for prophylactic purposes.  Interscalene block was obtained by the anesthesiologist without difficulty.  The patient was  brought into the operating room and placed on the operating table in supine position.  General anesthesia was obtained by the anesthesiologist without difficulty.  The patient was repositioned into the lateral decubitus position on a Gelfoam padded pegboard.  An axillary roll was placed underneath the chest wall.  All extremities were well padded.  The left upper extremity was prepped and draped in the usual sterile fashion.  Intraoperative time-out was performed with the operating room staff and the left shoulder was confirmed to be the operative site.    An exam under anesthesia demonstrated some grade 1 to 2 instability anteriorly.  There was no evidence of posterior instability.    A standard posterior portal was established.  Diagnostic arthroscopy was initiated.  Inspection of the glenohumeral joint demonstrated no chondral defects of the humeral head or glenoid surfaces.  There was no evidence of labral tearing.  The rotator cuff was intact.  The biceps tendon as well as biceps anchor were intact.  There was a positive drive-through sign.    Two standard anterior portals were established.  We rasped the anterior inferior capsular structures.  Since she had a positive drive-through sign, we decided to perform a anterior capsulorrhaphy.  We inserted a 3 mm Bio[employer] suture tack at the anterior inferior aspect of the glenoid.  The sutures were passed across the capsule labral complex using a suture lasso.  Arthroscopic knot-tying technique was utilized.  Once this was completed, there was complete obliteration of the drive-through sign.  The humeral head was nicely centered into the glenoid.    Next the arthroscope was introduced in the subacromial space.  Upon entering the subacromial space, there was significant bursitis present.  Using an arthroscopic shaver, an extensive bursectomy with decompression was performed.    Next the left shoulder was copiously irrigated.  Portal sites were closed using 4-0  nylon in interrupted fashion.  A sterile dressing was applied to the left shoulder as well as a sling to the left upper extremity.  The patient tolerated the procedure without difficulty and was brought to recovery room in stable condition.  Please note that Dr. Carrasco was present for the critical portions of the case to help decrease operating time.    Dictated By Murali Wadsworth MD  d: 04/15/2024 12:54:18  t: 04/15/2024 17:05:42  Job [id number]  KCT/

## (undated) DEVICE — CANNULA ENDOSCP 5.75MMX7CM DST RNG W/ ORNG

## (undated) DEVICE — PASSER SUT 25DEG L CRV TIGHT NIT WIRE LOOP 2

## (undated) DEVICE — 60 ML SYRINGE LUER-LOCK TIP: Brand: MONOJECT

## (undated) DEVICE — AMBIENT SUPER TURBOVAC 90 IFS: Brand: COBLATION

## (undated) DEVICE — COVER MAYOSTAND 23X54IN NWVN FAB

## (undated) DEVICE — BLADE SHV L13CM DIA4MM DBL CUT COOLCUT

## (undated) DEVICE — SLEEVE CMPR VLCR STRL

## (undated) DEVICE — GAMMEX® PI HYBRID SIZE 8, STERILE POWDER-FREE SURGICAL GLOVE, POLYISOPRENE AND NEOPRENE BLEND: Brand: GAMMEX

## (undated) DEVICE — PAD,ABDOMINAL,8"X7.5",STERILE,LF,1/PK: Brand: MEDLINE

## (undated) DEVICE — GAMMEX® PI HYBRID SIZE 7.5, STERILE POWDER-FREE SURGICAL GLOVE, POLYISOPRENE AND NEOPRENE BLEND: Brand: GAMMEX

## (undated) DEVICE — TUBING PMP L16FT DISP

## (undated) DEVICE — SLING ARM L L20IN D7IN DK BLU COT POLY WIDE

## (undated) DEVICE — BLADE SHV L13CM DIA4MM EXCALIBUR AGG COOLCUT

## (undated) DEVICE — CANNULA ARTHSCP L7CM ID7MM TRNSLUC THRD FLX

## (undated) DEVICE — SOLUTION IRRIG 3000ML 0.9% NACL FLX CONT

## (undated) DEVICE — SUT ETHLN 4-0 18IN FS-2 NABSRB BLK 19MM 3/8 C

## (undated) DEVICE — SHOULDER ARTHROSCOPY: Brand: MEDLINE INDUSTRIES, INC.

## (undated) DEVICE — MEDI-VAC NON-CONDUCTIVE SUCTION TUBING: Brand: CARDINAL HEALTH

## (undated) DEVICE — EXPRESSEW III SUTURE NEEDLE FOR USE WITH EXPRESSEW II OR III SUTURE PASSER: Brand: EXPRESSEW

## (undated) DEVICE — BUR SHV L13CM DIA4MM 8 FLUT OVL FOR RAP AGG

## (undated) DEVICE — 3M™ MICROFOAM™ TAPE 1528-4: Brand: 3M™ MICROFOAM™